# Patient Record
Sex: MALE | Race: WHITE | Employment: PART TIME | URBAN - METROPOLITAN AREA
[De-identification: names, ages, dates, MRNs, and addresses within clinical notes are randomized per-mention and may not be internally consistent; named-entity substitution may affect disease eponyms.]

---

## 2019-04-04 ENCOUNTER — HOSPITAL ENCOUNTER (OUTPATIENT)
Age: 34
Discharge: HOME | End: 2019-04-04

## 2019-04-04 VITALS
RESPIRATION RATE: 18 BRPM | TEMPERATURE: 98 F | HEART RATE: 71 BPM | SYSTOLIC BLOOD PRESSURE: 167 MMHG | OXYGEN SATURATION: 98 % | WEIGHT: 255 LBS | DIASTOLIC BLOOD PRESSURE: 93 MMHG

## 2019-04-04 DIAGNOSIS — T30.0 BURN: Primary | ICD-10-CM

## 2019-04-04 PROCEDURE — 16020 DRESS/DEBRID P-THICK BURN S: CPT

## 2019-04-04 PROCEDURE — 99203 OFFICE O/P NEW LOW 30 MIN: CPT
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
## 2021-06-07 ENCOUNTER — APPOINTMENT (OUTPATIENT)
Dept: GENERAL RADIOLOGY | Age: 36
End: 2021-06-07
Attending: EMERGENCY MEDICINE

## 2021-06-07 ENCOUNTER — HOSPITAL ENCOUNTER (OUTPATIENT)
Age: 36
Discharge: HOME | End: 2021-06-07
Attending: EMERGENCY MEDICINE

## 2021-06-07 VITALS
TEMPERATURE: 98 F | HEART RATE: 72 BPM | OXYGEN SATURATION: 100 % | DIASTOLIC BLOOD PRESSURE: 80 MMHG | SYSTOLIC BLOOD PRESSURE: 159 MMHG | RESPIRATION RATE: 20 BRPM

## 2021-06-07 DIAGNOSIS — S39.012A LUMBAR STRAIN, INITIAL ENCOUNTER: Primary | ICD-10-CM

## 2021-06-07 DIAGNOSIS — S20.211A RIB CONTUSION, RIGHT, INITIAL ENCOUNTER: ICD-10-CM

## 2021-06-07 PROCEDURE — 99213 OFFICE O/P EST LOW 20 MIN: CPT

## 2021-06-07 PROCEDURE — 71101 X-RAY EXAM UNILAT RIBS/CHEST: CPT | Performed by: EMERGENCY MEDICINE

## 2021-06-07 PROCEDURE — 72220 X-RAY EXAM SACRUM TAILBONE: CPT | Performed by: EMERGENCY MEDICINE

## 2021-06-07 PROCEDURE — 99214 OFFICE O/P EST MOD 30 MIN: CPT

## 2021-06-07 PROCEDURE — 72100 X-RAY EXAM L-S SPINE 2/3 VWS: CPT | Performed by: EMERGENCY MEDICINE

## 2021-06-07 RX ORDER — CYCLOBENZAPRINE HCL 10 MG
5 TABLET ORAL 3 TIMES DAILY PRN
Qty: 20 TABLET | Refills: 0 | Status: SHIPPED | OUTPATIENT
Start: 2021-06-07 | End: 2021-06-14
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
## 2021-06-07 NOTE — ED PROVIDER NOTES
Patient Seen in: [facility]      History   Patient presents with:  Back Pain: Diana Katherin off a ladder last week, back pain and check for possible concussion. - Entered by patient  Fall    Stated Complaint: Back Pain - Diana Katherin off a ladder last week, b [06/07/21 1126]   /80   Pulse 72   Resp 20   Temp 97.6 °F (36.4 °C)   Temp src Temporal   SpO2 100 %   O2 Device None (Room air)       Current:/80   Pulse 72   Temp 97.6 °F (36.4 °C) (Temporal)   Resp 20   SpO2 100%         Physical Exam    Con fracture                             Disposition and Plan     Clinical Impression:  Lumbar strain, initial encounter  (primary encounter diagnosis)  Rib contusion, right, initial encounter     Disposition:  Discharge  6/7/2021 12:10 pm    Follow-up:  Char Bedolla

## 2023-03-13 ENCOUNTER — LAB REQUISITION (OUTPATIENT)
Dept: LAB | Age: 38
End: 2023-03-13

## 2023-03-13 DIAGNOSIS — Z00.00 ENCOUNTER FOR GENERAL ADULT MEDICAL EXAMINATION WITHOUT ABNORMAL FINDINGS: ICD-10-CM

## 2023-03-18 ENCOUNTER — LAB SERVICES (OUTPATIENT)
Dept: LAB | Age: 38
End: 2023-03-18

## 2023-03-18 LAB
ALBUMIN SERPL-MCNC: 4 G/DL (ref 3.6–5.1)
ALBUMIN/GLOB SERPL: 1.1 {RATIO} (ref 1–2.4)
ALP SERPL-CCNC: 93 UNITS/L (ref 45–117)
ALT SERPL-CCNC: 87 UNITS/L
ANION GAP SERPL CALC-SCNC: 12 MMOL/L (ref 7–19)
AST SERPL-CCNC: 45 UNITS/L
BASOPHILS # BLD: 0.1 K/MCL (ref 0–0.3)
BASOPHILS NFR BLD: 1 %
BILIRUB SERPL-MCNC: 0.8 MG/DL (ref 0.2–1)
BUN SERPL-MCNC: 19 MG/DL (ref 6–20)
BUN/CREAT SERPL: 31 (ref 7–25)
CALCIUM SERPL-MCNC: 9.5 MG/DL (ref 8.4–10.2)
CHLORIDE SERPL-SCNC: 103 MMOL/L (ref 97–110)
CHOLEST SERPL-MCNC: 254 MG/DL
CHOLEST/HDLC SERPL: 4.3 {RATIO}
CO2 SERPL-SCNC: 27 MMOL/L (ref 21–32)
CREAT SERPL-MCNC: 0.61 MG/DL (ref 0.67–1.17)
DEPRECATED RDW RBC: 41.1 FL (ref 39–50)
EOSINOPHIL # BLD: 0.4 K/MCL (ref 0–0.5)
EOSINOPHIL NFR BLD: 5 %
ERYTHROCYTE [DISTWIDTH] IN BLOOD: 12.1 % (ref 11–15)
FASTING DURATION TIME PATIENT: 12 HOURS (ref 0–999)
FASTING DURATION TIME PATIENT: 12 HOURS (ref 0–999)
GFR SERPLBLD BASED ON 1.73 SQ M-ARVRAT: >90 ML/MIN
GLOBULIN SER-MCNC: 3.8 G/DL (ref 2–4)
GLUCOSE SERPL-MCNC: 96 MG/DL (ref 70–99)
HCT VFR BLD CALC: 49.1 % (ref 39–51)
HDLC SERPL-MCNC: 59 MG/DL
HGB BLD-MCNC: 16.2 G/DL (ref 13–17)
IMM GRANULOCYTES # BLD AUTO: 0 K/MCL (ref 0–0.2)
IMM GRANULOCYTES # BLD: 1 %
LDLC SERPL CALC-MCNC: 181 MG/DL
LYMPHOCYTES # BLD: 3.1 K/MCL (ref 1–4.8)
LYMPHOCYTES NFR BLD: 38 %
MCH RBC QN AUTO: 30.3 PG (ref 26–34)
MCHC RBC AUTO-ENTMCNC: 33 G/DL (ref 32–36.5)
MCV RBC AUTO: 91.9 FL (ref 78–100)
MONOCYTES # BLD: 1.1 K/MCL (ref 0.3–0.9)
MONOCYTES NFR BLD: 14 %
NEUTROPHILS # BLD: 3.4 K/MCL (ref 1.8–7.7)
NEUTROPHILS NFR BLD: 41 %
NONHDLC SERPL-MCNC: 195 MG/DL
NRBC BLD MANUAL-RTO: 0 /100 WBC
PLATELET # BLD AUTO: 429 K/MCL (ref 140–450)
POTASSIUM SERPL-SCNC: 4.8 MMOL/L (ref 3.4–5.1)
PROT SERPL-MCNC: 7.8 G/DL (ref 6.4–8.2)
RBC # BLD: 5.34 MIL/MCL (ref 4.5–5.9)
SODIUM SERPL-SCNC: 137 MMOL/L (ref 135–145)
TRIGL SERPL-MCNC: 72 MG/DL
TSH SERPL-ACNC: 2.04 MCUNITS/ML (ref 0.35–5)
WBC # BLD: 8.1 K/MCL (ref 4.2–11)

## 2023-03-18 PROCEDURE — 36415 COLL VENOUS BLD VENIPUNCTURE: CPT | Performed by: CLINICAL MEDICAL LABORATORY

## 2023-03-18 PROCEDURE — 80061 LIPID PANEL: CPT | Performed by: CLINICAL MEDICAL LABORATORY

## 2023-03-18 PROCEDURE — 80050 GENERAL HEALTH PANEL: CPT | Performed by: CLINICAL MEDICAL LABORATORY

## 2024-09-07 ENCOUNTER — APPOINTMENT (OUTPATIENT)
Dept: MRI IMAGING | Facility: HOSPITAL | Age: 39
End: 2024-09-07
Attending: EMERGENCY MEDICINE
Payer: COMMERCIAL

## 2024-09-07 ENCOUNTER — APPOINTMENT (OUTPATIENT)
Dept: CT IMAGING | Facility: HOSPITAL | Age: 39
End: 2024-09-07
Attending: EMERGENCY MEDICINE
Payer: COMMERCIAL

## 2024-09-07 ENCOUNTER — HOSPITAL ENCOUNTER (EMERGENCY)
Facility: HOSPITAL | Age: 39
Discharge: HOME | End: 2024-09-08
Attending: EMERGENCY MEDICINE
Payer: COMMERCIAL

## 2024-09-07 VITALS
DIASTOLIC BLOOD PRESSURE: 77 MMHG | SYSTOLIC BLOOD PRESSURE: 139 MMHG | HEIGHT: 71 IN | WEIGHT: 270 LBS | BODY MASS INDEX: 37.8 KG/M2 | OXYGEN SATURATION: 97 % | RESPIRATION RATE: 20 BRPM | TEMPERATURE: 97 F | HEART RATE: 63 BPM

## 2024-09-07 DIAGNOSIS — I74.9 TIA DUE TO EMBOLISM (HCC): ICD-10-CM

## 2024-09-07 DIAGNOSIS — G45.9 TIA DUE TO EMBOLISM (HCC): ICD-10-CM

## 2024-09-07 DIAGNOSIS — R07.81 PLEURITIC CHEST PAIN: ICD-10-CM

## 2024-09-07 DIAGNOSIS — R20.0 RIGHT ARM NUMBNESS: Primary | ICD-10-CM

## 2024-09-07 PROBLEM — R79.89 AZOTEMIA: Status: ACTIVE | Noted: 2024-09-07

## 2024-09-07 LAB
ALBUMIN SERPL-MCNC: 4.2 G/DL (ref 3.2–4.8)
ALP LIVER SERPL-CCNC: 95 U/L
ALT SERPL-CCNC: 59 U/L
ANION GAP SERPL CALC-SCNC: 9 MMOL/L (ref 0–18)
AST SERPL-CCNC: 39 U/L (ref ?–34)
BASOPHILS # BLD AUTO: 0.1 X10(3) UL (ref 0–0.2)
BASOPHILS NFR BLD AUTO: 1.1 %
BILIRUB DIRECT SERPL-MCNC: 0.1 MG/DL (ref ?–0.3)
BILIRUB SERPL-MCNC: 0.3 MG/DL (ref 0.3–1.2)
BUN BLD-MCNC: 16 MG/DL (ref 9–23)
BUN/CREAT SERPL: 20.8 (ref 10–20)
CALCIUM BLD-MCNC: 9.2 MG/DL (ref 8.7–10.4)
CHLORIDE SERPL-SCNC: 106 MMOL/L (ref 98–112)
CO2 SERPL-SCNC: 24 MMOL/L (ref 21–32)
CREAT BLD-MCNC: 0.77 MG/DL
DEPRECATED RDW RBC AUTO: 39.9 FL (ref 35.1–46.3)
EGFRCR SERPLBLD CKD-EPI 2021: 118 ML/MIN/1.73M2 (ref 60–?)
EOSINOPHIL # BLD AUTO: 0.43 X10(3) UL (ref 0–0.7)
EOSINOPHIL NFR BLD AUTO: 4.6 %
ERYTHROCYTE [DISTWIDTH] IN BLOOD BY AUTOMATED COUNT: 11.9 % (ref 11–15)
GLUCOSE BLD-MCNC: 95 MG/DL (ref 70–99)
HCT VFR BLD AUTO: 44.1 %
HGB BLD-MCNC: 15.3 G/DL
IMM GRANULOCYTES # BLD AUTO: 0.09 X10(3) UL (ref 0–1)
IMM GRANULOCYTES NFR BLD: 1 %
LYMPHOCYTES # BLD AUTO: 2.58 X10(3) UL (ref 1–4)
LYMPHOCYTES NFR BLD AUTO: 27.4 %
MCH RBC QN AUTO: 31.5 PG (ref 26–34)
MCHC RBC AUTO-ENTMCNC: 34.7 G/DL (ref 31–37)
MCV RBC AUTO: 90.9 FL
MONOCYTES # BLD AUTO: 0.88 X10(3) UL (ref 0.1–1)
MONOCYTES NFR BLD AUTO: 9.4 %
NEUTROPHILS # BLD AUTO: 5.32 X10 (3) UL (ref 1.5–7.7)
NEUTROPHILS # BLD AUTO: 5.32 X10(3) UL (ref 1.5–7.7)
NEUTROPHILS NFR BLD AUTO: 56.5 %
OSMOLALITY SERPL CALC.SUM OF ELEC: 289 MOSM/KG (ref 275–295)
PLATELET # BLD AUTO: 358 10(3)UL (ref 150–450)
POTASSIUM SERPL-SCNC: 3.6 MMOL/L (ref 3.5–5.1)
PROT SERPL-MCNC: 7.5 G/DL (ref 5.7–8.2)
RBC # BLD AUTO: 4.85 X10(6)UL
SODIUM SERPL-SCNC: 139 MMOL/L (ref 136–145)
TROPONIN I SERPL HS-MCNC: 3 NG/L
TROPONIN I SERPL HS-MCNC: 3 NG/L
WBC # BLD AUTO: 9.4 X10(3) UL (ref 4–11)

## 2024-09-07 PROCEDURE — 93005 ELECTROCARDIOGRAM TRACING: CPT

## 2024-09-07 PROCEDURE — 80076 HEPATIC FUNCTION PANEL: CPT | Performed by: EMERGENCY MEDICINE

## 2024-09-07 PROCEDURE — 93010 ELECTROCARDIOGRAM REPORT: CPT

## 2024-09-07 PROCEDURE — 70498 CT ANGIOGRAPHY NECK: CPT | Performed by: EMERGENCY MEDICINE

## 2024-09-07 PROCEDURE — 80048 BASIC METABOLIC PNL TOTAL CA: CPT | Performed by: EMERGENCY MEDICINE

## 2024-09-07 PROCEDURE — 71260 CT THORAX DX C+: CPT | Performed by: EMERGENCY MEDICINE

## 2024-09-07 PROCEDURE — 70551 MRI BRAIN STEM W/O DYE: CPT | Performed by: EMERGENCY MEDICINE

## 2024-09-07 PROCEDURE — 36415 COLL VENOUS BLD VENIPUNCTURE: CPT

## 2024-09-07 PROCEDURE — 99285 EMERGENCY DEPT VISIT HI MDM: CPT

## 2024-09-07 PROCEDURE — 84484 ASSAY OF TROPONIN QUANT: CPT | Performed by: EMERGENCY MEDICINE

## 2024-09-07 PROCEDURE — 85025 COMPLETE CBC W/AUTO DIFF WBC: CPT | Performed by: EMERGENCY MEDICINE

## 2024-09-07 PROCEDURE — 70496 CT ANGIOGRAPHY HEAD: CPT | Performed by: EMERGENCY MEDICINE

## 2024-09-07 RX ORDER — ASPIRIN 81 MG/1
81 TABLET ORAL DAILY
Qty: 30 TABLET | Refills: 0 | Status: SHIPPED | OUTPATIENT
Start: 2024-09-07 | End: 2024-10-07

## 2024-09-07 RX ORDER — ATORVASTATIN CALCIUM 40 MG/1
40 TABLET, FILM COATED ORAL NIGHTLY
Qty: 30 TABLET | Refills: 0 | Status: SHIPPED | OUTPATIENT
Start: 2024-09-07 | End: 2024-10-07

## 2024-09-07 RX ORDER — ACETAMINOPHEN 500 MG
1000 TABLET ORAL ONCE
Status: COMPLETED | OUTPATIENT
Start: 2024-09-07 | End: 2024-09-07

## 2024-09-08 LAB
ATRIAL RATE: 76 BPM
P AXIS: 36 DEGREES
P-R INTERVAL: 174 MS
Q-T INTERVAL: 384 MS
QRS DURATION: 94 MS
QTC CALCULATION (BEZET): 432 MS
R AXIS: 17 DEGREES
T AXIS: 13 DEGREES
VENTRICULAR RATE: 76 BPM

## 2024-09-08 NOTE — ED PROVIDER NOTES
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  It started at the shoulder and worked its way down to the hand.  He said it was completely numb.  He never had any neck pain or chest pain or back pain at that time.  Did start to worry him.  Eventually he started getting very lightheaded like he might faint and was getting tingling everywhere else after that.  Now that he is here in the hospital he says if he takes a deep breath he feels a tightness in his chest but he feels just sitting there he does not feel the tightness or heaviness in his chest.  Denies leg pain or swelling.  No trauma or travel or DVT risk factors.  He is very active and on his feet for 20 hours a day.  He works a lot.  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  Patient tearful while discussing the situation.  No cough or fever.  No vomiting or diarrhea.  No dysuria or bowel changes.            Objective:   History reviewed. No pertinent past medical history.           History reviewed. No pertinent surgical history.             Social History     Socioeconomic History    Marital status: Single   Tobacco Use    Smoking status: Never    Smokeless tobacco: Never   Vaping Use    Vaping status: Never Used   Substance and Sexual Activity    Alcohol use: Yes     Comment: weekends              Review of Systems    Positive for stated Chief Complaint: Chest Pain Angina    Other systems are as noted in HPI.  Constitutional and vital signs reviewed.      All other systems reviewed and negative except as noted  above.    Physical Exam     ED Triage Vitals [09/07/24 1941]   BP (!) 158/91   Pulse 77   Resp 25   Temp 97.3 °F (36.3 °C)   Temp src Temporal   SpO2 100 %   O2 Device None (Room air)       Current Vitals:   No data recorded          Physical Exam  HENT:      Head: Normocephalic.      Nose: Nose normal.      Mouth/Throat:      Mouth: Mucous membranes are moist.   Eyes:      Pupils: Pupils are equal, round, and reactive to light.   Cardiovascular:      Rate and Rhythm: Normal rate and regular rhythm.      Pulses: Normal pulses.   Pulmonary:      Effort: Pulmonary effort is normal.   Abdominal:      Palpations: Abdomen is soft.      Tenderness: There is no abdominal tenderness.   Musculoskeletal:         General: Normal range of motion.      Cervical back: Normal range of motion and neck supple.   Skin:     General: Skin is warm.      Capillary Refill: Capillary refill takes less than 2 seconds.   Neurological:      Mental Status: He is alert.      Sensory: No sensory deficit.      Motor: No weakness.               ED Course     Labs Reviewed   HEPATIC FUNCTION PANEL (7) - Abnormal; Notable for the following components:       Result Value    AST 39 (*)     ALT 59 (*)     All other components within normal limits   BASIC METABOLIC PANEL (8) - Abnormal; Notable for the following components:    BUN/CREA Ratio 20.8 (*)     All other components within normal limits   TROPONIN I HIGH SENSITIVITY - Normal   TROPONIN I HIGH SENSITIVITY - Normal   CBC WITH DIFFERENTIAL WITH PLATELET     EKG    Rate, intervals and axes as noted on EKG Report.  Rate: 76  Rhythm: Sinus Rhythm  Reading: Normal sinus rhythm and rate.  Normal axis and intervals.  Normal ST segments.  This EKG was interpreted by me.  Normal              ED Course as of 09/09/24 0853  ------------------------------------------------------------  Time: 09/07 2229  Comment: This CTAs independently turbid by me.  CBC normal.  CMP showed slight transaminitis, 2 troponins  negative.  Initially I discussed with neurology to bring in for an MRI but we might be able to get the MRI tonight.  Will hold on the bed and endorsed to Dr. Mikael ELLER      Chest CTA  COMPARISON: No priors    IMPRESSION:  Noncontrast head CT: No acute intracranial hemorrhage.  No edema or mass effect.  No evidence of an acute major infarct.  Normal size ventricles.  No hyperdense vessels side.  Mild ethmoid air cell opacification.  Bony calvarium appears intact.    CTA neck: No large vessel occlusion, dissection, aneurysm or critical stenosis.  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  Dominant left vertebral artery.    CTA head: No large vessel occlusion, dissection, aneurysm, or critical stenosis.  Dural venous sinuses appear patent.  Left transverse sinus is hypoplastic.    CTA chest:  Diagnostic bolus.  No pulmonary embolism or acute aortic process.  Heart size normal.    Airways patent.  Lungs are clear without concerning nodule, mass, consolidation.  No pleural effusion or pneumothorax.  No lymphadenopathy.  No osseous abnormality is seen.    Unremarkable partially imaged upper abdomen.    Case results were faxed/electronically transmitted at 10:34 PM ET.  If there are any questions please feel free to contact me directly at [phone]  ext [phone]. If you cannot reach me at this number, do not leave a voicemail. Please call [phone] ext [phone] and ask for the next available radiologist.        Dr. Dana Jane MD  This report has been electronically signed and verified by the Radiologist whose name is printed above.       This report contains privileged and confidential information and is intended solely for the use of the individual or entity to which it is addressed. If you are not the intended recipient of this report, you are hereby notified that any copying, distribution, dissemination or action taken in relation to the contents of this report is strictly  prohibited and may be unlawful. If you have received this report in error, please notify the sender immediately at [phone] and permanently delete the original report and destroy any copies or printouts.        CTA BRAIN + CTA CAROTIDS (CPT=70496/49601)    Result Date: 9/8/2024  CONCLUSION:  1. CTA head: No stenosis, occlusion, or gross aneurysm in the anterior or posterior circulation.  Anatomic variant:  Hypoplastic right A1 segment. 2. CTA neck: No stenosis, occlusion, or dissection of the carotids or vertebrals.  Anatomic variant:  Dominant left vertebral, and right vertebral terminates as a PICA.  3.  Noncontrast head CT:  No acute intracranial process.  Moderate chronic appearing paranasal sinus inflammation.  [facility] provided a preliminary report for this examination. This final report agrees with their preliminary findings.   Dictated by (CST): Calvin Khan MD on 9/08/2024 at 9:45 AM     Finalized by (CST): Calvin Khan MD on 9/08/2024 at 9:48 AM          MRI BRAIN WO ACUTE (3) SEQUENCE (CPT=70551)    Result Date: 9/8/2024  CONCLUSION: Acute intracranial process.  No evidence of acute or subacute infarct.   [facility] provided a preliminary report for this examination. This final report agrees with their preliminary findings.   Dictated by (CST): Calvin Khan MD on 9/08/2024 at 7:31 AM     Finalized by (CST): Calvin Khan MD on 9/08/2024 at 7:34 AM          CT CHEST PE AORTA (IV ONLY) (CPT=71260)    Result Date: 9/8/2024  CONCLUSION:  1.  No acute pulmonary embolus to the subsegmental pulmonary artery level. 2.  No acute aortic injury; no dissection.   [facility] provided a preliminary report for this examination. This final report agrees with their preliminary findings.   Dictated by (CST): Calvin Khan MD on 9/08/2024 at 6:19 AM     Finalized by (CST): Calvin Khan MD on 9/08/2024 at 6:21 AM                                       Medical Decision Making  Patient with history of  hypertension smoking here stating his right arm went numb but now it is back to normal.  He then developed tingling everywhere and lightheadedness and now feels tightness if he takes a deep breath.  No cough fever.  No vomiting or diarrhea.  Differential is vast could include radicular numbness, ACS, dissection although his radial pulses are symmetric, thoracic outlet syndrome, stroke and many other possibilities.  Patient tearful seems a little anxious.  Will do CTA head neck and chest rule out LVO, aneurysm, PE.    Amount and/or Complexity of Data Reviewed  External Data Reviewed: notes.  Labs: ordered.  Radiology: ordered and independent interpretation performed. Decision-making details documented in ED Course.     Details: Cta Head chest and neck showed no acute findings  ECG/medicine tests: ordered and independent interpretation performed. Decision-making details documented in ED Course.  Discussion of management or test interpretation with external provider(s): Patient developed headache and CT after the infusion.  Studies unremarkable.  No focal deficit now.  Will admit for further neurologic workup after discussion with on-call neurology.  Patient comfortable with plan.  Discussed with hospitalist for admission.    Risk  Decision regarding hospitalization.  Risk Details: Hypertension tobacco        Disposition and Plan     Clinical Impression:  1. Right arm numbness    2. Pleuritic chest pain    3. TIA due to embolism (HCC)         Disposition:  Discharge  9/7/2024 11:46 pm    Follow-up:  EM[facility]IA KEARNEY  [address] [place] [address] [address]  [place] [address]  Call  [phone] choose option 1 for general neurology and state that you are following up for TIA          Medications Prescribed:  Discharge Medication List as of 9/8/2024 12:04 AM        START taking these medications    Details   aspirin 81 MG Oral Tab EC Take 1 tablet (81 mg total) by mouth daily., Normal, Disp-30 tablet, R-0      atorvastatin 40  MG Oral Tab Take 1 tablet (40 mg total) by mouth nightly., Normal, Disp-30 tablet, R-0

## 2024-09-08 NOTE — ED INITIAL ASSESSMENT (HPI)
Pt arrives through triage with significant      complaints of whole body tingling and chest tightness that started a\"little bit ago\". Slight dizziness. Denies HA. Pt speaking in complete sentences. -NV

## 2024-09-08 NOTE — ED QUICK NOTES
Orders for admission, patient is aware of plan and ready to go upstairs. Any questions, please call ED RN Marina at extension [phone].     Patient Covid vaccination status: Unvaccinated     COVID Test Ordered in ED: None    COVID Suspicion at Admission: N/A    Running Infusions:  None    Mental Status/LOC at time of transport: A&Ox4    Other pertinent information:   CIWA score: N/A   NIH score:  N/A

## 2024-09-08 NOTE — ED PROVIDER NOTES
Received signout from  with plan for discharge to home with neurology follow up if MRI brain is negative, which it is. Updated patient who understands strict return precautions and to follow closely with neurology.  Discharged in stable condition        Preliminary Radiology Report  [facility]  [phone] - Phone    [facility]    NAME: GARRETT VALDIVIA    DATE OF EXAM: 09/07/2024  Patient No:  [id number]  Physician:  CATY^SHANE  YOB: 1985    Past Medical History (entered by Technologist):    Reason For Exam (entered by Technologist):  right arm numbess  Other Notes (entered by Technologist): CT prior in pacs sent to [facility]  [facility] [id number] [address]    Additional Information (per [facility]):      Brain MRI    Comparison: CTA head and neck from earlier same date.    IMPRESSION:    No diffusion restriction to suggest the presence of an acute infarct.    No evidence of FLAIR signal abnormality.  Pituitary measures up to 8 mm, normal.      Case results were faxed/electronically transmitted at 12:44 AM ET.  If there are any questions please feel free to contact me directly at [phone]  ext [phone]. If you cannot reach me at this number, do not leave a voicemail. Please call [phone] ext [phone] and ask for the next available radiologist.    Dr. Dana Jane MD

## 2024-09-08 NOTE — ED QUICK NOTES
Pt reports getting pressure behind bilateral eyes and then describes having tunnel vision where he loses peripheral vision. Pt does not currently reports having those symptoms now, states he has them on and off for a while. Denies arm numbness at this.

## 2024-09-09 ENCOUNTER — TELEPHONE (OUTPATIENT)
Dept: NEUROLOGY | Facility: CLINIC | Age: 39
End: 2024-09-09

## 2024-09-09 NOTE — TELEPHONE ENCOUNTER
TIA CLINIC SCREENING    1. Date of ED visit/TIA diagnosis:  09/08/2024   Time of discharge from ED:  0013    2. Is patient currently admitted?  No   If YES - TIA Clinic Appointment not required.      3. Does patient already see an [facility] neurologist?  No  Name:     (if YES - TIA Clinic Appointment not required.  Route message on to patient's neurologist)    4. Patient's current anti-platelet therapy:  81mg ASA    5. Patient's current statin therapy:  atorvastatin 40 MG Oral Tab     6. Has 2D Echo with bubble test been done?  No  Date:      7. Is TIA Clinic Appointment indicated?  Yes

## 2024-09-10 ENCOUNTER — LAB ENCOUNTER (OUTPATIENT)
Dept: LAB | Facility: HOSPITAL | Age: 39
End: 2024-09-10
Attending: Other
Payer: COMMERCIAL

## 2024-09-10 ENCOUNTER — TELEPHONE (OUTPATIENT)
Dept: NEUROLOGY | Facility: CLINIC | Age: 39
End: 2024-09-10

## 2024-09-10 ENCOUNTER — OFFICE VISIT (OUTPATIENT)
Dept: NEUROLOGY | Facility: CLINIC | Age: 39
End: 2024-09-10
Payer: COMMERCIAL

## 2024-09-10 VITALS
BODY MASS INDEX: 37.8 KG/M2 | WEIGHT: 270 LBS | OXYGEN SATURATION: 95 % | HEIGHT: 71 IN | RESPIRATION RATE: 18 BRPM | HEART RATE: 59 BPM

## 2024-09-10 DIAGNOSIS — G45.9 TIA (TRANSIENT ISCHEMIC ATTACK): ICD-10-CM

## 2024-09-10 DIAGNOSIS — G45.9 TIA (TRANSIENT ISCHEMIC ATTACK): Primary | ICD-10-CM

## 2024-09-10 DIAGNOSIS — R20.0 RIGHT ARM NUMBNESS: ICD-10-CM

## 2024-09-10 DIAGNOSIS — R29.818 TRANSIENT NEUROLOGICAL SYMPTOMS: ICD-10-CM

## 2024-09-10 LAB
CHOLEST SERPL-MCNC: 237 MG/DL (ref ?–200)
FASTING PATIENT LIPID ANSWER: YES
HDLC SERPL-MCNC: 63 MG/DL (ref 40–59)
LDLC SERPL CALC-MCNC: 159 MG/DL (ref ?–100)
NONHDLC SERPL-MCNC: 174 MG/DL (ref ?–130)
TRIGL SERPL-MCNC: 86 MG/DL (ref 30–149)
VLDLC SERPL CALC-MCNC: 17 MG/DL (ref 0–30)

## 2024-09-10 PROCEDURE — 99204 OFFICE O/P NEW MOD 45 MIN: CPT | Performed by: OTHER

## 2024-09-10 PROCEDURE — 80061 LIPID PANEL: CPT

## 2024-09-10 PROCEDURE — 36415 COLL VENOUS BLD VENIPUNCTURE: CPT

## 2024-09-10 RX ORDER — ASPIRIN 81 MG/1
81 TABLET ORAL DAILY
Qty: 90 TABLET | Refills: 3 | Status: SHIPPED | OUTPATIENT
Start: 2024-09-10

## 2024-09-10 NOTE — TELEPHONE ENCOUNTER
----- Message from Alberto Carnes sent at 9/10/2024  1:01 PM CDT -----  Please let the patient know that cholesterol was high so he should start with Lipitor as we discussed.  We will recheck it next time he comes for follow-up.    Thank you

## 2024-09-10 NOTE — TELEPHONE ENCOUNTER
Called and spoke patient about his abnormal labs and doctors recommendations for him to began Lipitor and labs will be rechecked at next office visit. A letter has been sent to patient.

## 2024-09-10 NOTE — PROGRESS NOTES
[facility] [address], [address]  [facility] [address]  [phone]            Neurology Initial Visit     Referred By: Dr. Walsh ref. provider found    Chief Complaint:   Chief Complaint   Patient presents with    Hospital F/U     New Pt is coming in after a HFU, for a TIA, MRI and CTA on file. Is taking medication. And states that Pt states that he is feeling fine        HPI:     Randall Moore is a 39 year old male, who presents for history of transient right arm numbness.  Patient who has history of smoking in the past, quit 2 months prior to the visit in September 2024.  History of hypercholesteremia as well.  History of ocular migraines without headaches, maybe occurring once or twice a year.  Had an episode of transient, about 15 minutes numbness that progressed over the right arm, became quite numb, and then resolved and swollen.  There was no other associated symptoms, but he did develop some mild headache after that.  He went to the emergency room.  CT angiogram of the head and neck was not revealing.  MRI of the brain was nonrevealing.  He denied ever having similar symptoms in the past.     History reviewed. No pertinent past medical history.    History reviewed. No pertinent surgical history.    Social history:  History   Smoking Status    Never   Smokeless Tobacco    Never     History   Alcohol Use    Yes     Comment: weekends     History   Drug use: Unknown    Types: Cannabis     Comment: occ       History reviewed. No pertinent family history.      Current Outpatient Medications:     aspirin (ASPIRIN 81) 81 MG Oral Tab EC, Take 1 tablet (81 mg total) by mouth daily., Disp: 90 tablet, Rfl: 3    aspirin 81 MG Oral Tab EC, Take 1 tablet (81 mg total) by mouth daily., Disp: 30 tablet, Rfl: 0    atorvastatin 40 MG Oral Tab, Take 1 tablet (40 mg total) by mouth nightly., Disp: 30 tablet, Rfl: 0    Allergies   Allergen Reactions    Bees SWELLING       ROS:   As in  HPI, the rest of the 14 system review was done and was negative      Physical Exam:  Vitals:    09/10/24 0911   Pulse: 59   Resp: 18   SpO2: 95%   Weight: 270 lb (122.5 kg)   Height: 71\"       General: No apparent distress, well nourished, well groomed.  Head- Normocephalic, atraumatic  Eyes- No redness or swelling  ENT- Hearing intake, normal glutition  Neck- No masses or adenopathy  Cv: pulses were palpable and normal, no cyanosis or edema     Neurological:     Mental Status- Alert and oriented x3.  Normal attention span and concentration  Thought process intact  Memory intact- recent and remote  Mood intact  Fund of knowledge appropriate for education and age    Language intact including: comprehension, naming, repetition, vocabulary    Cranial Nerves:  II.- Visual fields full to confrontation        Fundoscopically- No papilledema or retinal hemorrhages. Normal optic discs, sharp edges.  III, IV, VI- EOM intact, MARY  V. Facial sensation intact  VII. Face symmetric, no facial weakness  VIII. Hearing intact to whisper.  IX. Pallet elevates symmetrically.  XI. Shoulder shrug is intact  XII. Tongue is midline    Motor Exam:  Muscle tone normal  No atrophy or fasciculations  Strength- upper extremities 5/5 proximally and distally                  - lower  extremities 5/5 proximally and distally    Sensory Exam:  Light touch sensation- intact in all 4 extremities      Coordination:  Finger to nose intact  Rapid alternating movements intact    Gait:  Normal posture  Normal physiologic      Labs:    No results found for: \"TSH\"  No results found for: \"CHOL\", \"HDL\", \"LDL\", \"TRIG\"  Lab Results   Component Value Date    HGB 15.3 09/07/2024    HCT 44.1 09/07/2024    MCV 90.9 09/07/2024    WBC 9.4 09/07/2024    .0 09/07/2024      Lab Results   Component Value Date    BUN 16 09/07/2024    CA 9.2 09/07/2024    ALT 59 (H) 09/07/2024    AST 39 (H) 09/07/2024    ALB 4.2 09/07/2024     09/07/2024    K 3.6 09/07/2024      09/07/2024    CO2 24.0 09/07/2024      I have reviewed labs.    Imaging Studies:  I have independently reviewed imaging.  MRI of the brain was essentially normal        Assessment   1. TIA (transient ischemic attack)  Patient with transient neurological symptoms.  High possibility of these being complicated migraine, but with his history of smoking and hypercholesteremia is reasonable to think about possibility of TIA.  Third remote possibility is the seizure.  Therefore further testing will be done.  Patient also will be started on aspirin.  Counseled about importance of exercise and weight loss.  He will work with his primary care doctor about those tissues.      STROKE RISK FACTORS:   *Hypertension - Target blood pressure <140/90, <130/85 for high risk, normal 120/80  *Physical inactivity - target exercise at least 3 times per week  *Obesity - target ideal body weight and girth <35\" for women, <40\" for men  *Diabetes - Target <6.5-7%   *Smoking - Target smoking cessation  *Hyperlipidemia - Target total cholesterol < 200, Target LDL <100, < 70 for high risk, Target HDL >45 for men, >55 for women, Target triglycerides <150        - EEG  - CARD ECHO 2D W DOPPLER/BUBBLES (CPT=93306); Future  - CARD ZIO EXTENDED MONITOR 8-14 DAYS (CPT=93246/13464); Future  - aspirin (ASPIRIN 81) 81 MG Oral Tab EC; Take 1 tablet (81 mg total) by mouth daily.  Dispense: 90 tablet; Refill: 3  - Lipid Panel; Future    2. Right arm numbness      3. Transient neurological symptoms    - EEG           Education and counseling provided to patient. Instructed patient to call my office or seek medical attention immediately if symptoms worsen.  Patient verbalized understanding of information given. All questions were answered. All side effects of drugs were discussed.       Return to clinic in: Return in about 4 weeks (around 10/8/2024).    Alberto Carnes MD

## 2024-10-10 ENCOUNTER — OFFICE VISIT (OUTPATIENT)
Dept: NEUROLOGY | Facility: CLINIC | Age: 39
End: 2024-10-10
Payer: COMMERCIAL

## 2024-10-10 VITALS — HEIGHT: 72 IN | BODY MASS INDEX: 23.03 KG/M2 | WEIGHT: 170 LBS

## 2024-10-10 DIAGNOSIS — R20.0 RIGHT ARM NUMBNESS: ICD-10-CM

## 2024-10-10 DIAGNOSIS — G45.9 TIA (TRANSIENT ISCHEMIC ATTACK): Primary | ICD-10-CM

## 2024-10-10 DIAGNOSIS — R29.818 TRANSIENT NEUROLOGICAL SYMPTOMS: ICD-10-CM

## 2024-10-10 PROCEDURE — 99213 OFFICE O/P EST LOW 20 MIN: CPT | Performed by: OTHER

## 2024-10-10 RX ORDER — ASPIRIN 81 MG/1
81 TABLET ORAL DAILY
Qty: 90 TABLET | Refills: 3 | Status: SHIPPED | OUTPATIENT
Start: 2024-10-10

## 2024-10-10 RX ORDER — ATORVASTATIN CALCIUM 40 MG/1
40 TABLET, FILM COATED ORAL NIGHTLY
Qty: 90 TABLET | Refills: 3 | Status: SHIPPED | OUTPATIENT
Start: 2024-10-10

## 2024-10-10 RX ORDER — ATORVASTATIN CALCIUM 40 MG/1
40 TABLET, FILM COATED ORAL NIGHTLY
COMMUNITY
End: 2024-10-10

## 2024-10-10 NOTE — PROGRESS NOTES
[facility] [address], [facility] [address]  [facility] [address]  [phone]            Neurology follow-up visit     Referred By: Dr. Walsh ref. provider found    Chief Complaint:   Chief Complaint   Patient presents with    TIA     LOV: 9/10/24 Patient is present today to follow up on transient ischemic attack. Patient has done labs and schedule the cardio monitor for 10/21/24 .Current Medication: ASPIRIN 81 MG and atorvastatin 40 MG Tabs         HPI:     Randall Moore is a 39 year old male, who presents for history of transient right arm numbness.  Patient who has history of smoking in the past, quit 2 months prior to the visit in September 2024.  History of hypercholesteremia as well.  History of ocular migraines without headaches, maybe occurring once or twice a year.  Had an episode of transient, about 15 minutes numbness that progressed over the right arm, became quite numb, and then resolved.  There was no other associated symptoms, but he did develop some mild headache after that.  He went to the emergency room.  CT angiogram of the head and neck was not revealing.  MRI of the brain was nonrevealing.  He denied ever having similar symptoms in the past.  Possibility of TIA versus complicated migraines, versus seizures was entertained.  Therefore further workup was done.  LDL was 149, therefore statin was continued.  Patient also was placed on aspirin.  On a follow-up visit in October 2024 we still waiting for the other tests to be done.  He still had few episodes of ocular migraines.    No past medical history on file.    No past surgical history on file.    Social history:  History   Smoking Status    Never   Smokeless Tobacco    Never     History   Alcohol Use    Yes     Comment: weekends     History   Drug use: Unknown    Types: Cannabis     Comment: occ       No family history on file.      Current Outpatient Medications:     atorvastatin 40 MG Oral Tab, Take 1 tablet (40  mg total) by mouth nightly., Disp: , Rfl:     aspirin (ASPIRIN 81) 81 MG Oral Tab EC, Take 1 tablet (81 mg total) by mouth daily., Disp: 90 tablet, Rfl: 3    Allergies   Allergen Reactions    Bees SWELLING       ROS:   As in HPI, the rest of the 14 system review was done and was negative      Physical Exam:  Vitals:    10/10/24 0908   Weight: 170 lb (77.1 kg)   Height: 72\"       General: No apparent distress, well nourished, well groomed.  Head- Normocephalic, atraumatic  Eyes- No redness or swelling  ENT- Hearing intake, normal glutition  Neck- No masses or adenopathy  Cv: pulses were palpable and normal, no cyanosis or edema     Neurological:     Mental Status- Alert and oriented x3.  Normal attention span and concentration  Thought process intact  Memory intact- recent and remote  Mood intact  Fund of knowledge appropriate for education and age    Language intact including: comprehension, naming, repetition, vocabulary    Cranial Nerves:  II.- Visual fields full to confrontation        Fundoscopically- No papilledema or retinal hemorrhages. Normal optic discs, sharp edges.  III, IV, VI- EOM intact, MARY  V. Facial sensation intact  VII. Face symmetric, no facial weakness  VIII. Hearing intact to whisper.  IX. Pallet elevates symmetrically.  XI. Shoulder shrug is intact  XII. Tongue is midline    Motor Exam:  Muscle tone normal  No atrophy or fasciculations  Strength- upper extremities 5/5 proximally and distally                  - lower  extremities 5/5 proximally and distally    Sensory Exam:  Light touch sensation- intact in all 4 extremities      Coordination:  Finger to nose intact  Rapid alternating movements intact    Gait:  Normal posture  Normal physiologic      Labs:    No results found for: \"TSH\"  Lab Results   Component Value Date    HDL 63 (H) 09/10/2024     (H) 09/10/2024    TRIG 86 09/10/2024     Lab Results   Component Value Date    HGB 15.3 09/07/2024    HCT 44.1 09/07/2024    MCV 90.9  09/07/2024    WBC 9.4 09/07/2024    .0 09/07/2024      Lab Results   Component Value Date    BUN 16 09/07/2024    CA 9.2 09/07/2024    ALT 59 (H) 09/07/2024    AST 39 (H) 09/07/2024    ALB 4.2 09/07/2024     09/07/2024    K 3.6 09/07/2024     09/07/2024    CO2 24.0 09/07/2024      I have reviewed labs.    Imaging Studies:  I have independently reviewed imaging.  MRI of the brain was essentially normal        Assessment   1. TIA (transient ischemic attack)  Patient with transient neurological symptoms.  High possibility of these being complicated migraine, but with his history of smoking and hypercholesteremia is reasonable to think about possibility of TIA.  Continue to treat hypercholesteremia as well  Third remote possibility is the seizure.  Therefore further testing will be done.  Patient also will be continued on aspirin.  Counseled about importance of exercise and weight loss.  He will work with his primary care doctor about those tissues.      STROKE RISK FACTORS:   *Hypertension - Target blood pressure <140/90, <130/85 for high risk, normal 120/80  *Physical inactivity - target exercise at least 3 times per week  *Obesity - target ideal body weight and girth <35\" for women, <40\" for men  *Diabetes - Target <6.5-7%   *Smoking - Target smoking cessation  *Hyperlipidemia - Target total cholesterol < 200, Target LDL <100, < 70 for high risk, Target HDL >45 for men, >55 for women, Target triglycerides <150        - EEG  - CARD ECHO 2D W DOPPLER/BUBBLES (CPT=93306); Future  - CARD ZIO EXTENDED MONITOR 8-14 DAYS (CPT=93246/54515); Future  - aspirin (ASPIRIN 81) 81 MG Oral Tab EC; Take 1 tablet (81 mg total) by mouth daily.  Dispense: 90 tablet; Refill: 3  - Lipid Panel; Future    2. Right arm numbness      3. Transient neurological symptoms    - EEG           Education and counseling provided to patient. Instructed patient to call my office or seek medical attention immediately if symptoms  worsen.  Patient verbalized understanding of information given. All questions were answered. All side effects of drugs were discussed.       Return to clinic in: No follow-ups on file.    Alberto Carnes MD

## (undated) NOTE — LETTER
September 10, 2024     Randall Moore  [address] [facility] [address]      Dear Randall:    Below are the results from your recent visit:    Resulted Orders   Lipid Panel   Result Value Ref Range    Cholesterol, Total 237 (H) <200 mg/dL    HDL Cholesterol 63 (H) 40 - 59 mg/dL    Triglycerides 86 30 - 149 mg/dL    LDL Cholesterol 159 (H) <100 mg/dL    VLDL 17 0 - 30 mg/dL    Non HDL Chol 174 (H) <130 mg/dL    Patient Fasting for Lipid? Yes      The test results show that your cholesterol was high so you  should start with Lipitor as we discussed.  We will recheck it next time you come in for follow-up.     If you have any questions or concerns, please don't hesitate to call.        Kind regards,  KHADAR AU MD